# Patient Record
Sex: FEMALE | Race: WHITE
[De-identification: names, ages, dates, MRNs, and addresses within clinical notes are randomized per-mention and may not be internally consistent; named-entity substitution may affect disease eponyms.]

---

## 2018-02-10 ENCOUNTER — HOSPITAL ENCOUNTER (EMERGENCY)
Dept: HOSPITAL 45 - C.EDB | Age: 23
Discharge: HOME | End: 2018-02-10
Payer: COMMERCIAL

## 2018-02-10 VITALS — DIASTOLIC BLOOD PRESSURE: 63 MMHG | SYSTOLIC BLOOD PRESSURE: 97 MMHG

## 2018-02-10 VITALS — TEMPERATURE: 98.06 F

## 2018-02-10 VITALS
WEIGHT: 136.69 LBS | HEIGHT: 64.02 IN | WEIGHT: 136.69 LBS | BODY MASS INDEX: 23.34 KG/M2 | HEIGHT: 64.02 IN | BODY MASS INDEX: 23.34 KG/M2

## 2018-02-10 VITALS — HEART RATE: 89 BPM | OXYGEN SATURATION: 99 %

## 2018-02-10 DIAGNOSIS — Z79.3: ICD-10-CM

## 2018-02-10 DIAGNOSIS — R10.9: ICD-10-CM

## 2018-02-10 DIAGNOSIS — M54.9: ICD-10-CM

## 2018-02-10 DIAGNOSIS — R35.0: ICD-10-CM

## 2018-02-10 DIAGNOSIS — N39.0: Primary | ICD-10-CM

## 2018-02-10 DIAGNOSIS — R50.9: ICD-10-CM

## 2018-02-10 LAB
ALBUMIN SERPL-MCNC: 3.4 GM/DL (ref 3.4–5)
ALP SERPL-CCNC: 36 U/L (ref 45–117)
ALT SERPL-CCNC: 12 U/L (ref 12–78)
AST SERPL-CCNC: 13 U/L (ref 15–37)
BASOPHILS # BLD: 0.02 K/UL (ref 0–0.2)
BASOPHILS NFR BLD: 0.2 %
BUN SERPL-MCNC: 14 MG/DL (ref 7–18)
CALCIUM SERPL-MCNC: 9.2 MG/DL (ref 8.5–10.1)
CO2 SERPL-SCNC: 26 MMOL/L (ref 21–32)
CREAT SERPL-MCNC: 0.86 MG/DL (ref 0.6–1.2)
EOS ABS #: 0.04 K/UL (ref 0–0.5)
EOSINOPHIL NFR BLD AUTO: 270 K/UL (ref 130–400)
GLUCOSE SERPL-MCNC: 91 MG/DL (ref 70–99)
HCT VFR BLD CALC: 35.7 % (ref 37–47)
HGB BLD-MCNC: 12.2 G/DL (ref 12–16)
IG#: 0.03 K/UL (ref 0–0.02)
IMM GRANULOCYTES NFR BLD AUTO: 9.5 %
LIPASE: 90 U/L (ref 73–393)
LYMPHOCYTES # BLD: 1.09 K/UL (ref 1.2–3.4)
MCH RBC QN AUTO: 31.9 PG (ref 25–34)
MCHC RBC AUTO-ENTMCNC: 34.2 G/DL (ref 32–36)
MCV RBC AUTO: 93.2 FL (ref 80–100)
MONO ABS #: 0.98 K/UL (ref 0.11–0.59)
MONOCYTES NFR BLD: 8.5 %
NEUT ABS #: 9.37 K/UL (ref 1.4–6.5)
NEUTROPHILS # BLD AUTO: 0.3 %
NEUTROPHILS NFR BLD AUTO: 81.2 %
PMV BLD AUTO: 10.1 FL (ref 7.4–10.4)
POTASSIUM SERPL-SCNC: 3.7 MMOL/L (ref 3.5–5.1)
PROT SERPL-MCNC: 7.2 GM/DL (ref 6.4–8.2)
RED CELL DISTRIBUTION WIDTH CV: 13.5 % (ref 11.5–14.5)
RED CELL DISTRIBUTION WIDTH SD: 45.9 FL (ref 36.4–46.3)
SODIUM SERPL-SCNC: 137 MMOL/L (ref 136–145)
WBC # BLD AUTO: 11.53 K/UL (ref 4.8–10.8)

## 2018-02-10 NOTE — DIAGNOSTIC IMAGING REPORT
ABD/PELVIS IV CONTRAST ONLY



CLINICAL HISTORY: 22 years-old Female presenting with RIGHT FLANK PAIN, FEVER,

UTI symptoms. 



TECHNIQUE: Multidetector CT of the abdomen and pelvis was performed after the

administration of intravenous contrast. IV contrast: 94 mL of Optiray 320. A

dose lowering technique was used consistent with the principles of ALARA (as low

as reasonably achievable). 



COMPARISON: None.



CT DOSE (mGy.cm): The estimated cumulative dose is 359.18 mGy.cm.



FINDINGS:



 topogram: Unremarkable.



Lung bases: Lungs and pleural spaces clear. Normal heart size. No pericardial or

pleural effusion. 



Liver: Normal morphology. Mild periportal edema and heterogeneity of liver

enhancement. An ill-defined 3.4 cm mass is noted within the medial segment of

the left hepatic lobe (series 3 image 95). Patent hepatic vasculature.



Biliary: Mild intrahepatic biliary ductal prominence. No extrahepatic delivery

ductal dilatation. Normal gallbladder.



Pancreas: Normal.



Spleen: Normal.



Adrenal glands: Normal.



Kidneys and ureters: Trace right perinephric fat stranding. Normal parenchymal

enhancement of the kidneys. No nephrolithiasis. No hydronephrosis. Urothelial

thickening noted on the right with urothelial hyperenhancement. Mild distention

of the right ureter and mild periureteral stranding. Left ureter normal.



Bladder: Circumferential bladder wall thickening.



Pelvic organs: Uterus and ovaries normal.



Bowel: Normal appendix. No bowel obstruction.



Peritoneal cavity: Trace free fluid in the pelvis.



Lymph nodes: No enlarged lymph nodes in the abdomen or pelvis.



Vasculature: Aorta and IVC patent and normal in caliber.



Abdominal wall: Normal.



Musculoskeletal: Normal.



IMPRESSION:

1.  Extensive urothelial thickening and abnormal enhancements of the right

ureter with associated circumferential bladder wall thickening. This is

consistent with complicated cystitis with upper tract involvement. No CT

evidence of pyelonephritis, which is not excluded the diagnosis. No

hydronephrosis or nephrolithiasis. No renal abscess.



2.  Heterogeneity of liver parenchymal enhancement and periportal edema with

trace intrahepatic biliary ductal dilatation likely relates to aggressive volume

resuscitation.



3.  Incidental 3.4 cm mass in the medial left hepatic lobe. This mostly

represents a focal nodular hyperplasia, which is benign. However, this is

incompletely characterized. Dedicated contrast-enhanced liver MR with Eovist

recommended for definitive characterization.











Electronically signed by:  David Medellin M.D.

2/10/2018 10:03 AM



Dictated Date/Time:  2/10/2018 9:57 AM

## 2018-02-10 NOTE — EMERGENCY ROOM VISIT NOTE
History


First contact with patient:  08:20


Chief Complaint:  FLANK PAIN


Stated Complaint:  SHARP PAIN IN RIGHT SIDE, FEVER 100.4





History of Present Illness


Patient is a 22-year-old white female who presents to the emergency department 

for evaluation of right flank pain and UTI symptoms 3 days.  She states that 

her symptoms actually started reading is go with diffuse abdominal discomfort.  

She had exercised and done a lot of abdominal exercises and thought that maybe 

her abs were sore.  The pain however localized more to the right mid back, and 

persisted.  She notes that it is been constant in nature and describes it as a 

cramping sensation.  She felt that she may be constipated, but reports that she 

has been moving her bowels and this has not helped with her symptoms.  She also 

notes urinary frequency and discomfort with urination.  She tried taking over-

the-counter Cystex, but this did not help.  She says that last night the pain 

began to worsen, and began to wrap around the front of her abdomen on the 

right.  She also began to run a low-grade fever, MAXIMUM TEMPERATURE was 100.4

F orally.  The pain is now throbbing in nature and she rates it a 7/10.  It is 

worse with movement.  She has not tried taking any additional medications for 

her pain or fever.  She denies any prior history of kidney infections or kidney 

stones, does report a history of UTIs in the past, but has not been on any 

antibiotics recently.  She denies any nausea or vomiting, has a slight headache 

and feels a little "shaky."  She denies any diarrhea or melena or hematochezia.

  Last menstrual period was one week ago, she is on an oral contraceptive.  She 

was last sexually active a couple of weeks ago.  She denies any abnormal 

vaginal discharge.  No gynecologic history.





Review of Systems


Review of systems as per HPI.  All other systems reviewed were negative.  10 

systems reviewed.





Past Medical/Surgical History


Medical Problems:


(1) No Known Active Medical Problems


Surgical Problems:


(1) History of reconstruction of anterior cruciate ligament tear


(2) History of wisdom tooth extraction


Electronic medical records are reviewed and summarized as above/below.  See 

Problem List.





Social History


Smoking Status:  Never Smoker


Housing Status:  lives with significant other


Occupation Status:  [employer] student





Current/Historical Medications


Scheduled


Birth Control Pills (Birth Control Pills), 1 TAB PO DAILY


Ciprofloxacin Hcl (Cipro), 500 MG PO BID





Physical Exam


Vital Signs











  Date Time  Temp Pulse Resp B/P (MAP) Pulse Ox O2 Delivery O2 Flow Rate FiO2


 


2/10/18 12:51  89   99   


 


2/10/18 11:00  85  97/63 97 Room Air  


 


2/10/18 08:16 36.7 125 18 119/63 96 Room Air  











Physical Exam


CONSTITUTIONAL: Patient is a well-appearing 22-year-old white female who is 

awake and alert and in no acute distress.  She was initially noted to be 

tachycardic in triage, heart rate at time of exam was 104 beats per minute.  

She is afebrile.


EYES:  Pupils equal, round, reactive to light and accommodation.  EOMs intact 

without nystagmus.  Sclera are anicteric. 


ENT:  Tympanic membranes intact, with normal landmarks.  External canals are 

clear.  Oral and nasopharynx are clear.  Mucous membranes are moist, no lesions

, tongue and gums appear normal.     


NECK: Supple without lymphadenopathy.  No thyromegaly.  No meningeal signs.  

Full active range of motion without discomfort.


CARDIOVASCULAR: Regular rate and rhythm, with normal S1 and S2, no murmur or 

gallop or rub is heard.  No carotid bruits auscultated.  No JVD.  Peripheral 

pulses easily palpable.


RESPIRATORY: Breath sounds equal and clear to auscultation without wheezes, 

rales, or rhonchi heard.   Full and equal chest expansion without accessory 

muscle use or retractions. 


ABDOMEN: Bowel sounds are present.  Abdomen is soft, scaphoid, nondistended, 

mildly tender in the right mid abdomen, without guarding or rebound.  She does 

not have pain low over McBurney's point.  Minimal right CVA tenderness.


INTEGUMENTARY: No lesions or rash, normal skin turgor. 


LYMPH: No lymphadenopathy.





Medical Decision & Procedures


ER Provider


Diagnostic Interpretation:


ABD/PELVIS IV CONTRAST ONLY





CLINICAL HISTORY: 22 years-old Female presenting with RIGHT FLANK PAIN, FEVER,


UTI symptoms. 





TECHNIQUE: Multidetector CT of the abdomen and pelvis was performed after the


administration of intravenous contrast. IV contrast: 94 mL of Optiray 320. A


dose lowering technique was used consistent with the principles of ALARA (as low


as reasonably achievable). 





COMPARISON: None.





CT DOSE (mGy.cm): The estimated cumulative dose is 359.18 mGy.cm.





FINDINGS:





 topogram: Unremarkable.





Lung bases: Lungs and pleural spaces clear. Normal heart size. No pericardial or


pleural effusion. 





Liver: Normal morphology. Mild periportal edema and heterogeneity of liver


enhancement. An ill-defined 3.4 cm mass is noted within the medial segment of


the left hepatic lobe (series 3 image 95). Patent hepatic vasculature.





Biliary: Mild intrahepatic biliary ductal prominence. No extrahepatic delivery


ductal dilatation. Normal gallbladder.





Pancreas: Normal.





Spleen: Normal.





Adrenal glands: Normal.





Kidneys and ureters: Trace right perinephric fat stranding. Normal parenchymal


enhancement of the kidneys. No nephrolithiasis. No hydronephrosis. Urothelial


thickening noted on the right with urothelial hyperenhancement. Mild distention


of the right ureter and mild periureteral stranding. Left ureter normal.





Bladder: Circumferential bladder wall thickening.





Pelvic organs: Uterus and ovaries normal.





Bowel: Normal appendix. No bowel obstruction.





Peritoneal cavity: Trace free fluid in the pelvis.





Lymph nodes: No enlarged lymph nodes in the abdomen or pelvis.





Vasculature: Aorta and IVC patent and normal in caliber.





Abdominal wall: Normal.





Musculoskeletal: Normal.





IMPRESSION:


1.  Extensive urothelial thickening and abnormal enhancements of the right


ureter with associated circumferential bladder wall thickening. This is


consistent with complicated cystitis with upper tract involvement. No CT


evidence of pyelonephritis, which is not excluded the diagnosis. No


hydronephrosis or nephrolithiasis. No renal abscess.





2.  Heterogeneity of liver parenchymal enhancement and periportal edema with


trace intrahepatic biliary ductal dilatation likely relates to aggressive volume


resuscitation.





3.  Incidental 3.4 cm mass in the medial left hepatic lobe. This mostly


represents a focal nodular hyperplasia, which is benign. However, this is


incompletely characterized. Dedicated contrast-enhanced liver MR with Eovist


recommended for definitive characterization.





Laboratory Results


2/10/18 08:55








Red Blood Count 3.83, Mean Corpuscular Volume 93.2, Mean Corpuscular Hemoglobin 

31.9, Mean Corpuscular Hemoglobin Concent 34.2, Mean Platelet Volume 10.1, 

Neutrophils (%) (Auto) 81.2, Lymphocytes (%) (Auto) 9.5, Monocytes (%) (Auto) 

8.5, Eosinophils (%) (Auto) 0.3, Basophils (%) (Auto) 0.2, Neutrophils # (Auto) 

9.37, Lymphocytes # (Auto) 1.09, Monocytes # (Auto) 0.98, Eosinophils # (Auto) 

0.04, Basophils # (Auto) 0.02





2/10/18 08:55

















Test


  2/10/18


08:30 2/10/18


08:42 2/10/18


08:55


 


Urine Color YELLOW   


 


Urine Appearance CLOUDY (CLEAR)   


 


Urine pH 5.5 (4.5-7.5)   


 


Urine Specific Gravity


  1.019


(1.000-1.030) 


  


 


 


Urine Protein 1+ (NEG)   


 


Urine Glucose (UA) NEG (NEG)   


 


Urine Ketones NEG (NEG)   


 


Urine Occult Blood 2+ (NEG)   


 


Urine Nitrite NEG (NEG)   


 


Urine Bilirubin NEG (NEG)   


 


Urine Urobilinogen NEG (NEG)   


 


Urine Leukocyte Esterase LARGE (NEG)   


 


Urine WBC (Auto) >30 /hpf (0-5)   


 


Urine RBC (Auto)


  10-30 /hpf


(0-4) 


  


 


 


Urine Hyaline Casts (Auto) 0 /lpf (0-5)   


 


Urine Epithelial Cells (Auto) >30 /lpf (0-5)   


 


Urine Bacteria (Auto) 1+ (NEG)   


 


Urine Pregnancy Test  NEG (NEG)  


 


White Blood Count


  


  


  11.53 K/uL


(4.8-10.8)


 


Red Blood Count


  


  


  3.83 M/uL


(4.2-5.4)


 


Hemoglobin


  


  


  12.2 g/dL


(12.0-16.0)


 


Hematocrit   35.7 % (37-47) 


 


Mean Corpuscular Volume


  


  


  93.2 fL


()


 


Mean Corpuscular Hemoglobin


  


  


  31.9 pg


(25-34)


 


Mean Corpuscular Hemoglobin


Concent 


  


  34.2 g/dl


(32-36)


 


Platelet Count


  


  


  270 K/uL


(130-400)


 


Mean Platelet Volume


  


  


  10.1 fL


(7.4-10.4)


 


Neutrophils (%) (Auto)   81.2 % 


 


Lymphocytes (%) (Auto)   9.5 % 


 


Monocytes (%) (Auto)   8.5 % 


 


Eosinophils (%) (Auto)   0.3 % 


 


Basophils (%) (Auto)   0.2 % 


 


Neutrophils # (Auto)


  


  


  9.37 K/uL


(1.4-6.5)


 


Lymphocytes # (Auto)


  


  


  1.09 K/uL


(1.2-3.4)


 


Monocytes # (Auto)


  


  


  0.98 K/uL


(0.11-0.59)


 


Eosinophils # (Auto)


  


  


  0.04 K/uL


(0-0.5)


 


Basophils # (Auto)


  


  


  0.02 K/uL


(0-0.2)


 


RDW Standard Deviation


  


  


  45.9 fL


(36.4-46.3)


 


RDW Coefficient of Variation


  


  


  13.5 %


(11.5-14.5)


 


Immature Granulocyte % (Auto)   0.3 % 


 


Immature Granulocyte # (Auto)


  


  


  0.03 K/uL


(0.00-0.02)


 


Anion Gap


  


  


  6.0 mmol/L


(3-11)


 


Est Creatinine Clear Calc


Drug Dose 


  


  90.2 ml/min 


 


 


Estimated GFR (


American) 


  


  112.7 


 


 


Estimated GFR (Non-


American 


  


  97.3 


 


 


BUN/Creatinine Ratio   16.3 (10-20) 


 


Calcium Level


  


  


  9.2 mg/dl


(8.5-10.1)


 


Total Bilirubin


  


  


  0.6 mg/dl


(0.2-1)


 


Aspartate Amino Transf


(AST/SGOT) 


  


  13 U/L (15-37) 


 


 


Alanine Aminotransferase


(ALT/SGPT) 


  


  12 U/L (12-78) 


 


 


Alkaline Phosphatase


  


  


  36 U/L


()


 


Total Protein


  


  


  7.2 gm/dl


(6.4-8.2)


 


Albumin


  


  


  3.4 gm/dl


(3.4-5.0)


 


Globulin


  


  


  3.8 gm/dl


(2.5-4.0)


 


Albumin/Globulin Ratio   0.9 (0.9-2) 


 


Lipase


  


  


  90 U/L


()











Medications Administered











 Medications


  (Trade)  Dose


 Ordered  Sig/Franco


 Route  Start Time


 Stop Time Status Last Admin


Dose Admin


 


 Sodium Chloride  1,000 ml @ 


 999 mls/hr  Q1H1M STAT


 IV  2/10/18 08:42


 2/10/18 09:42 DC 2/10/18 08:59


999 MLS/HR


 


 Ketorolac


 Tromethamine


  (Toradol Inj)  30 mg  NOW  STAT


 IV  2/10/18 08:42


 2/10/18 08:44 DC 2/10/18 08:59


30 MG


 


 Ceftriaxone Sodium


  (Rocephin Inj)  1 gm  NOW  STAT


 IV  2/10/18 10:19


 2/10/18 10:20 DC 2/10/18 11:02


1 GM











ED Course


The patient was seen and assessed as above.  Old records were reviewed.  She 

presents the emergency department for evaluation of 3 days of right flank pain 

with associated UTI symptoms.  IV lock was initiated and laboratory studies 

were collected.  She was hydrated with a liter of normal saline solution, 

Toradol 30 mg IV CBC with differential, CMP, lipase and urinalysis were 

collected.





Laboratory studies noted a minimally elevated white count 11,500, with left 

shift and bandemia.  She is not anemic.  Electrolytes and renal functions are 

normal.  LFTs and lipase are not elevated.  Urinalysis notes 2+ occult blood, 

large amount of esterase and greater than 30 WBCs.  She does have 1+ bacteria.  

She does have greater than 30 epithelial cells.  Urine pregnancy test was 

negative.  Given her symptoms, urine culture was ordered and is pending. 





Given her right flank pain, and the findings on urinalysis, CT scan of the 

abdomen and pelvis with IV contrast was ordered.  The patient has 

circumferential bladder wall thickening in addition to extensive urothelial 

thickening and abnormal enhancements of the right


ureter .  Per radiologist, findings are consistent with a complicated cystitis 

involving the upper urinary tract, there was no obvious evidence for 

pyelonephritis, however could not be completely excluded.  There is no 

hydronephrosis or nephrolithiasis and no renal abscess.





Given findings on CT scan the patient was given ceftriaxone 1 g IV.  All 

laboratory and diagnostic imaging studies were discussed with her.  She is 

otherwise afebrile, well-appearing and has a benign abdominal exam.  It was 

felt that she was an appropriate candidate for outpatient management.  She'll 

be discharged home on Cipro.  She was encouraged to monitor for any worsening 

pain, fevers, or vomiting, and return to the emergency department at any point 

for worsening symptoms.  She is understanding of this and was agreeable.  

Discharge vitals are stable, tachycardia had improved with IV hydration and 

analgesia.  She rated her discomfort a 6/10 at discharge.





Differential diagnoses entertained included UTI, pyelonephritis, renal colic, 

appendicitis, ovarian cyst, ovarian torsion, pregnancy, ectopic pregnancy, PID, 

tubo-ovarian abscess, hernia, musculoskeletal pain, shingles, among others.





Medical Decision


See ED Course.





Medication Reconcilliation


Current Medication List:  was personally reviewed by me





Blood Pressure Screening


Patient's blood pressure:  Normal blood pressure


Blood pressure disposition:  Did not require urgent referral





Impression





 Primary Impression:  


 Complicated urinary tract infection


 Additional Impression:  


 Right flank pain





Departure Information


Prescriptions





Ciprofloxacin Hcl (CIPRO) 500 Mg Tab


500 MG PO BID, #20 TAB


   Prov: Rosi Morgan PA         2/10/18





Referrals


No Doctor, Assigned (PCP)





Patient Instructions


My [facility]





Additional Instructions





Ciprofloxacin(Cipro) 500mg: Take one pill twice daily for 7 days for your urine 

infection.  


All antibiotics can cause diarrhea.  If this occurs and you feel worse or it 

does not 


resolve in 1-2 days follow up with your doctor or return to the Emergency 

Department as this 


could be signs of serious underlying problems. If you experience any pain in 

your 


tendons/joints or any tendon injury return to the ER for re-evaluation.    Any 

medication 


can cause an allergic reaction, stop the pills immediately and return to the ER 

for rash, 


hives, breathing difficulties, or swelling.





Ibuprofen(Motrin, Advil) may be used for fever or pain.  Use 600mg every six 

hours as 


needed.  Take with food.  Avoid using more than 2400mg in a 24 hour period.  Do 

not use 


2400mg per day for more than three consecutive days without physician 

direction.  Prolonged 


inappropriate use can lead to stomach upset or ulcers.  This is available over 

the counter 


and typically comes in 200mg tablets.  


(AND/OR)


Acetaminophen(Tylenol) may be used for fever or pain.  Use 1000mg every eight 

hours as 


needed.  Avoid using more than 3000mg in a 24 hour period.  This is available 

over the 


counter.





Read all the package inserts or medication information paperwork provided.  If 

you have any 


questions or concerns call your primary provider, pharmacist or the ER for 

assistance.








Rest and drink plenty of fluids as tolerated.  Slow sips of water or sports 

drinks are 


recommended instead of large amounts all at once.  





Continue current medications.





Once your stomach is settled start with a clear liquid diet (jello, soup broth, 

etc.) and 


then advance as tolerated.  You should avoid full, heavy meals for about 24 hrs 

from the 


time your symptoms resolved.  





Return to the ER immediately for worsening or persistent abdominal/back pain, 

vomiting, 


fevers, worsening of your condition, or as needed.





Follow up with your primary physician within 2-3 days for a recheck of the 

current condition





Problem Qualifiers

## 2018-02-12 NOTE — PHARMACY PROGRESS NOTE
ED Pharmacist Culture FollowUp


Date of Service:


Feb 12, 2018.





Patient was sent home with a prescription for ciprofloxacin 500 mg BID x 7 days

, which should cover the E. coli growing from the patient's urine culture.